# Patient Record
Sex: FEMALE | Race: WHITE | Employment: FULL TIME | URBAN - METROPOLITAN AREA
[De-identification: names, ages, dates, MRNs, and addresses within clinical notes are randomized per-mention and may not be internally consistent; named-entity substitution may affect disease eponyms.]

---

## 2017-03-16 ENCOUNTER — APPOINTMENT (OUTPATIENT)
Dept: LAB | Age: 57
End: 2017-03-16
Attending: NURSE PRACTITIONER
Payer: COMMERCIAL

## 2017-03-16 DIAGNOSIS — R82.90 FOUL SMELLING URINE: ICD-10-CM

## 2017-03-16 PROCEDURE — 87086 URINE CULTURE/COLONY COUNT: CPT

## 2017-07-20 ENCOUNTER — LAB ENCOUNTER (OUTPATIENT)
Dept: LAB | Age: 57
End: 2017-07-20
Attending: INTERNAL MEDICINE
Payer: COMMERCIAL

## 2017-07-20 DIAGNOSIS — R82.90 BAD ODOR OF URINE: ICD-10-CM

## 2017-07-20 PROCEDURE — 87186 SC STD MICRODIL/AGAR DIL: CPT

## 2017-07-20 PROCEDURE — 87088 URINE BACTERIA CULTURE: CPT

## 2017-07-20 PROCEDURE — 87086 URINE CULTURE/COLONY COUNT: CPT

## 2017-07-28 RX ORDER — CIPROFLOXACIN 250 MG/1
250 TABLET, FILM COATED ORAL 2 TIMES DAILY
Qty: 14 TABLET | Refills: 0 | OUTPATIENT
Start: 2017-07-28 | End: 2017-08-04

## 2017-07-28 NOTE — PROGRESS NOTES
Spoke with pt, expressed understanding, informed pt that Dr Kinsey Su wanted her to get labs and see urology. If unable to do so d/t living in [place] should seek medical care in [place].

## 2017-08-22 ENCOUNTER — WALK IN (OUTPATIENT)
Dept: URGENT CARE | Age: 57
End: 2017-08-22

## 2017-08-22 VITALS
BODY MASS INDEX: 22.02 KG/M2 | HEART RATE: 77 BPM | DIASTOLIC BLOOD PRESSURE: 62 MMHG | WEIGHT: 129 LBS | TEMPERATURE: 97.2 F | OXYGEN SATURATION: 97 % | HEIGHT: 64 IN | SYSTOLIC BLOOD PRESSURE: 98 MMHG

## 2017-08-22 DIAGNOSIS — J01.90 ACUTE RHINOSINUSITIS: Primary | ICD-10-CM

## 2017-08-22 PROCEDURE — 99203 OFFICE O/P NEW LOW 30 MIN: CPT | Performed by: FAMILY MEDICINE

## 2017-08-22 RX ORDER — AMOXICILLIN AND CLAVULANATE POTASSIUM 875; 125 MG/1; MG/1
1 TABLET, FILM COATED ORAL 2 TIMES DAILY
Qty: 20 TABLET | Refills: 0 | Status: SHIPPED | OUTPATIENT
Start: 2017-08-22 | End: 2017-09-01

## 2017-08-22 RX ORDER — BENZONATATE 100 MG/1
100 CAPSULE ORAL 3 TIMES DAILY PRN
Qty: 30 CAPSULE | Refills: 0 | Status: SHIPPED | OUTPATIENT
Start: 2017-08-22 | End: 2018-05-31 | Stop reason: ALTCHOICE

## 2017-08-22 ASSESSMENT — ENCOUNTER SYMPTOMS
TROUBLE SWALLOWING: 0
CHOKING: 0
SHORTNESS OF BREATH: 0
CHILLS: 0
COUGH: 1
DIARRHEA: 0
FATIGUE: 1
ACTIVITY CHANGE: 1
LIGHT-HEADEDNESS: 0
NUMBNESS: 0
SWOLLEN GLANDS: 1
DIAPHORESIS: 0
APPETITE CHANGE: 0
HEADACHES: 1
SINUS PRESSURE: 1
FEVER: 0
WEAKNESS: 0
STRIDOR: 0
EYES NEGATIVE: 1
CHEST TIGHTNESS: 0
DIZZINESS: 0
SORE THROAT: 1
ANOREXIA: 0
VOMITING: 0
WHEEZING: 0
ABDOMINAL PAIN: 0
RHINORRHEA: 1

## 2017-08-25 ENCOUNTER — TELEPHONE (OUTPATIENT)
Dept: TELEHEALTH | Age: 57
End: 2017-08-25

## 2018-03-05 ENCOUNTER — WALK IN (OUTPATIENT)
Dept: URGENT CARE | Age: 58
End: 2018-03-05

## 2018-03-05 VITALS
OXYGEN SATURATION: 97 % | DIASTOLIC BLOOD PRESSURE: 58 MMHG | HEART RATE: 85 BPM | SYSTOLIC BLOOD PRESSURE: 98 MMHG | RESPIRATION RATE: 16 BRPM | WEIGHT: 133 LBS | BODY MASS INDEX: 22.71 KG/M2 | TEMPERATURE: 97.7 F | HEIGHT: 64 IN

## 2018-03-05 DIAGNOSIS — R05.9 COUGH: ICD-10-CM

## 2018-03-05 DIAGNOSIS — J01.80 ACUTE NON-RECURRENT SINUSITIS OF OTHER SINUS: Primary | ICD-10-CM

## 2018-03-05 PROCEDURE — 99213 OFFICE O/P EST LOW 20 MIN: CPT | Performed by: NURSE PRACTITIONER

## 2018-03-05 RX ORDER — ALBUTEROL SULFATE 90 UG/1
2 AEROSOL, METERED RESPIRATORY (INHALATION) EVERY 4 HOURS PRN
Qty: 1 INHALER | Refills: 0 | Status: SHIPPED | OUTPATIENT
Start: 2018-03-05 | End: 2018-05-31 | Stop reason: ALTCHOICE

## 2018-03-05 RX ORDER — AMOXICILLIN AND CLAVULANATE POTASSIUM 875; 125 MG/1; MG/1
1 TABLET, FILM COATED ORAL EVERY 12 HOURS
Qty: 20 TABLET | Refills: 0 | Status: SHIPPED | OUTPATIENT
Start: 2018-03-05 | End: 2018-05-31 | Stop reason: ALTCHOICE

## 2018-03-06 ENCOUNTER — TELEPHONE (OUTPATIENT)
Dept: SCHEDULING | Age: 58
End: 2018-03-06

## 2018-05-31 ENCOUNTER — WALK IN (OUTPATIENT)
Dept: URGENT CARE | Age: 58
End: 2018-05-31

## 2018-05-31 VITALS
WEIGHT: 132.28 LBS | TEMPERATURE: 98.2 F | SYSTOLIC BLOOD PRESSURE: 120 MMHG | DIASTOLIC BLOOD PRESSURE: 61 MMHG | RESPIRATION RATE: 18 BRPM | OXYGEN SATURATION: 100 % | BODY MASS INDEX: 23.06 KG/M2 | HEART RATE: 77 BPM

## 2018-05-31 DIAGNOSIS — F17.200 SMOKER: ICD-10-CM

## 2018-05-31 DIAGNOSIS — J02.9 SORE THROAT: ICD-10-CM

## 2018-05-31 DIAGNOSIS — R06.2 WHEEZING: Primary | ICD-10-CM

## 2018-05-31 LAB
% PREDICTED PEAK FLOW: NORMAL % (ref 80–150)
ALBUTEROL DOSE: NORMAL INHALATIONS
AZMACORT DOSE: NORMAL INHALATIONS
FLOVENT DOSE: NORMAL INHALATIONS
O2 SATURATION,RESTING: NORMAL % (ref 95–100)
PEAK EXPIRATORY FLOW: 300 L/MIN (ref 30–900)
PULMICORT DOSE: NORMAL INHALATIONS
SINGULAIR DOSE: NORMAL INHALATIONS

## 2018-05-31 PROCEDURE — 99214 OFFICE O/P EST MOD 30 MIN: CPT | Performed by: FAMILY MEDICINE

## 2018-05-31 PROCEDURE — 94799 UNLISTED PULMONARY SVC/PX: CPT | Performed by: FAMILY MEDICINE

## 2018-05-31 RX ORDER — ALBUTEROL SULFATE 90 UG/1
2 AEROSOL, METERED RESPIRATORY (INHALATION) EVERY 4 HOURS PRN
Qty: 1 INHALER | Refills: 3 | Status: SHIPPED | OUTPATIENT
Start: 2018-05-31 | End: 2019-09-23 | Stop reason: ALTCHOICE

## 2018-05-31 RX ORDER — PREDNISONE 20 MG/1
TABLET ORAL
Qty: 10 TABLET | Refills: 0 | Status: SHIPPED | OUTPATIENT
Start: 2018-05-31 | End: 2018-06-05

## 2018-05-31 ASSESSMENT — ENCOUNTER SYMPTOMS
SHORTNESS OF BREATH: 1
COUGH: 1
RHINORRHEA: 0
FEVER: 0
SORE THROAT: 1
WHEEZING: 1

## 2018-05-31 ASSESSMENT — COPD QUESTIONNAIRES: COPD: 0

## 2018-11-08 ENCOUNTER — WALK IN (OUTPATIENT)
Dept: URGENT CARE | Age: 58
End: 2018-11-08

## 2018-11-08 VITALS
HEART RATE: 80 BPM | HEIGHT: 64 IN | SYSTOLIC BLOOD PRESSURE: 94 MMHG | TEMPERATURE: 98.2 F | OXYGEN SATURATION: 100 % | RESPIRATION RATE: 18 BRPM | BODY MASS INDEX: 23.39 KG/M2 | WEIGHT: 137 LBS | DIASTOLIC BLOOD PRESSURE: 58 MMHG

## 2018-11-08 DIAGNOSIS — Z86.14 HISTORY OF MRSA INFECTION: ICD-10-CM

## 2018-11-08 DIAGNOSIS — J34.89 IRRITATION OF NOSE: Primary | ICD-10-CM

## 2018-11-08 PROCEDURE — 87147 CULTURE TYPE IMMUNOLOGIC: CPT | Performed by: INTERNAL MEDICINE

## 2018-11-08 PROCEDURE — 87186 SC STD MICRODIL/AGAR DIL: CPT | Performed by: INTERNAL MEDICINE

## 2018-11-08 PROCEDURE — 87070 CULTURE OTHR SPECIMN AEROBIC: CPT | Performed by: INTERNAL MEDICINE

## 2018-11-08 PROCEDURE — 99213 OFFICE O/P EST LOW 20 MIN: CPT | Performed by: PHYSICIAN ASSISTANT

## 2018-11-08 PROCEDURE — 87205 SMEAR GRAM STAIN: CPT | Performed by: INTERNAL MEDICINE

## 2018-11-08 ASSESSMENT — ENCOUNTER SYMPTOMS
CHILLS: 0
SORE THROAT: 0
FACIAL SWELLING: 0
DIZZINESS: 0
FEVER: 0
HEADACHES: 0
COUGH: 0
ABDOMINAL PAIN: 0

## 2018-11-11 ENCOUNTER — CLINICAL ABSTRACT (OUTPATIENT)
Dept: OTHER | Age: 58
End: 2018-11-11

## 2018-11-11 LAB
BACTERIA SPEC AEROBE CULT: ABNORMAL
GRAM STN SPEC: ABNORMAL
ORGANISM: ABNORMAL
REPORT STATUS (RPT): ABNORMAL
SPECIMEN SOURCE: ABNORMAL

## 2018-11-12 ENCOUNTER — TELEPHONE (OUTPATIENT)
Dept: URGENT CARE | Age: 58
End: 2018-11-12

## 2019-06-27 ENCOUNTER — WALK IN (OUTPATIENT)
Dept: URGENT CARE | Age: 59
End: 2019-06-27

## 2019-06-27 VITALS
HEART RATE: 76 BPM | DIASTOLIC BLOOD PRESSURE: 60 MMHG | OXYGEN SATURATION: 97 % | RESPIRATION RATE: 16 BRPM | WEIGHT: 129 LBS | TEMPERATURE: 97.1 F | SYSTOLIC BLOOD PRESSURE: 123 MMHG | BODY MASS INDEX: 22.49 KG/M2

## 2019-06-27 DIAGNOSIS — J06.9 ACUTE URI: Primary | ICD-10-CM

## 2019-06-27 DIAGNOSIS — J02.9 SORE THROAT: ICD-10-CM

## 2019-06-27 LAB — S PYO AG THROAT QL IA.RAPID: NEGATIVE

## 2019-06-27 PROCEDURE — 99213 OFFICE O/P EST LOW 20 MIN: CPT | Performed by: FAMILY MEDICINE

## 2019-06-27 PROCEDURE — 87880 STREP A ASSAY W/OPTIC: CPT | Performed by: FAMILY MEDICINE

## 2019-06-27 RX ORDER — AZITHROMYCIN 250 MG/1
TABLET, FILM COATED ORAL
Qty: 6 TABLET | Refills: 0 | Status: SHIPPED | OUTPATIENT
Start: 2019-06-27 | End: 2019-09-23 | Stop reason: ALTCHOICE

## 2019-06-27 RX ORDER — BENZONATATE 100 MG/1
100 CAPSULE ORAL 3 TIMES DAILY PRN
Qty: 20 CAPSULE | Refills: 0 | Status: SHIPPED | OUTPATIENT
Start: 2019-06-27

## 2019-09-23 ENCOUNTER — WALK IN (OUTPATIENT)
Dept: URGENT CARE | Age: 59
End: 2019-09-23

## 2019-09-23 VITALS
DIASTOLIC BLOOD PRESSURE: 68 MMHG | BODY MASS INDEX: 22.84 KG/M2 | WEIGHT: 131 LBS | OXYGEN SATURATION: 98 % | HEART RATE: 90 BPM | RESPIRATION RATE: 16 BRPM | TEMPERATURE: 97.7 F | SYSTOLIC BLOOD PRESSURE: 102 MMHG

## 2019-09-23 DIAGNOSIS — F17.200 SMOKER: ICD-10-CM

## 2019-09-23 DIAGNOSIS — J40 BRONCHITIS: Primary | ICD-10-CM

## 2019-09-23 DIAGNOSIS — J45.20 MILD INTERMITTENT ASTHMA WITHOUT COMPLICATION: ICD-10-CM

## 2019-09-23 DIAGNOSIS — R06.2 WHEEZING: ICD-10-CM

## 2019-09-23 PROCEDURE — 94640 AIRWAY INHALATION TREATMENT: CPT | Performed by: NURSE PRACTITIONER

## 2019-09-23 PROCEDURE — 99214 OFFICE O/P EST MOD 30 MIN: CPT | Performed by: NURSE PRACTITIONER

## 2019-09-23 RX ORDER — IPRATROPIUM BROMIDE AND ALBUTEROL SULFATE 2.5; .5 MG/3ML; MG/3ML
3 SOLUTION RESPIRATORY (INHALATION) ONCE
Status: COMPLETED | OUTPATIENT
Start: 2019-09-23 | End: 2019-09-23

## 2019-09-23 RX ORDER — AZITHROMYCIN 250 MG/1
TABLET, FILM COATED ORAL
Qty: 6 TABLET | Refills: 0 | Status: SHIPPED | OUTPATIENT
Start: 2019-09-23 | End: 2019-09-28

## 2019-09-23 RX ORDER — ALBUTEROL SULFATE 90 UG/1
2 AEROSOL, METERED RESPIRATORY (INHALATION) EVERY 4 HOURS PRN
Qty: 1 INHALER | Refills: 0 | Status: SHIPPED | OUTPATIENT
Start: 2019-09-23

## 2019-09-23 RX ADMIN — IPRATROPIUM BROMIDE AND ALBUTEROL SULFATE 3 ML: 2.5; .5 SOLUTION RESPIRATORY (INHALATION) at 14:07

## 2019-09-23 ASSESSMENT — ENCOUNTER SYMPTOMS
COUGH: 1
ACTIVITY CHANGE: 0
SORE THROAT: 0
TROUBLE SWALLOWING: 0
APPETITE CHANGE: 0
WHEEZING: 1
CHILLS: 1
SHORTNESS OF BREATH: 1
FEVER: 0
SINUS PRESSURE: 1

## 2019-09-26 ENCOUNTER — TELEPHONE (OUTPATIENT)
Dept: URGENT CARE | Age: 59
End: 2019-09-26

## 2019-09-26 ENCOUNTER — WALK IN (OUTPATIENT)
Dept: URGENT CARE | Age: 59
End: 2019-09-26

## 2019-09-26 VITALS
HEART RATE: 76 BPM | RESPIRATION RATE: 16 BRPM | SYSTOLIC BLOOD PRESSURE: 126 MMHG | WEIGHT: 130 LBS | BODY MASS INDEX: 22.67 KG/M2 | DIASTOLIC BLOOD PRESSURE: 76 MMHG | TEMPERATURE: 98.6 F

## 2019-09-26 DIAGNOSIS — N30.01 ACUTE CYSTITIS WITH HEMATURIA: Primary | ICD-10-CM

## 2019-09-26 DIAGNOSIS — R35.0 URINARY FREQUENCY: ICD-10-CM

## 2019-09-26 DIAGNOSIS — R30.0 DYSURIA: ICD-10-CM

## 2019-09-26 LAB
APPEARANCE UR: CLEAR
BACTERIA #/AREA URNS HPF: ABNORMAL /HPF
BILIRUB UR QL STRIP: NEGATIVE
BILIRUB UR QL STRIP: NORMAL
CLARITY, POC: NORMAL
COLOR UR: NORMAL
COLOR UR: YELLOW
GLUCOSE UR STRIP-MCNC: NEGATIVE MG/DL
GLUCOSE UR-MCNC: NEGATIVE MG/DL
HGB UR QL STRIP: ABNORMAL
HYALINE CASTS #/AREA URNS LPF: ABNORMAL /LPF (ref 0–5)
KETONES UR STRIP-MCNC: ABNORMAL MG/DL
KETONES, POC: NORMAL
LEUKOCYTE ESTERASE UR QL STRIP: ABNORMAL
LEUKOCYTE ESTERASE UR QL STRIP: NORMAL
NITRITE UR QL STRIP: NEGATIVE
NITRITE UR QL STRIP: NEGATIVE
OCCULT BLOOD, POC: NORMAL
PH UR STRIP: 5.5 UNITS (ref 5–7)
PH UR STRIP: 5.5 [PH]
PROT UR STRIP-MCNC: ABNORMAL MG/DL
PROT UR-MCNC: NORMAL G/DL
RBC #/AREA URNS HPF: ABNORMAL /HPF (ref 0–2)
SP GR UR STRIP: 1.02
SP GR UR STRIP: 1.02 (ref 1–1.03)
SPECIMEN SOURCE: ABNORMAL
SQUAMOUS #/AREA URNS HPF: ABNORMAL /HPF (ref 0–5)
UROBILINOGEN UR STRIP-MCNC: 0.2 MG/DL (ref 0–1)
UROBILINOGEN UR-MCNC: 0.2 MG/DL (ref 0–1)
WBC #/AREA URNS HPF: ABNORMAL /HPF (ref 0–5)

## 2019-09-26 PROCEDURE — 99213 OFFICE O/P EST LOW 20 MIN: CPT | Performed by: PHYSICIAN ASSISTANT

## 2019-09-26 PROCEDURE — 81001 URINALYSIS AUTO W/SCOPE: CPT | Performed by: PHYSICIAN ASSISTANT

## 2019-09-26 PROCEDURE — 87086 URINE CULTURE/COLONY COUNT: CPT | Performed by: INTERNAL MEDICINE

## 2019-09-26 PROCEDURE — 87088 URINE BACTERIA CULTURE: CPT | Performed by: INTERNAL MEDICINE

## 2019-09-26 PROCEDURE — 87186 SC STD MICRODIL/AGAR DIL: CPT | Performed by: INTERNAL MEDICINE

## 2019-09-26 RX ORDER — NITROFURANTOIN 25; 75 MG/1; MG/1
100 CAPSULE ORAL 2 TIMES DAILY
Qty: 10 CAPSULE | Refills: 0 | Status: SHIPPED | OUTPATIENT
Start: 2019-09-26 | End: 2019-10-01

## 2019-09-26 RX ORDER — NITROFURANTOIN 25; 75 MG/1; MG/1
100 CAPSULE ORAL 2 TIMES DAILY
Qty: 10 CAPSULE | Refills: 0 | Status: SHIPPED | OUTPATIENT
Start: 2019-09-26 | End: 2019-09-26

## 2019-09-26 ASSESSMENT — ENCOUNTER SYMPTOMS
CHILLS: 0
FEVER: 0
BACK PAIN: 0
SHORTNESS OF BREATH: 0
VOMITING: 0
ABDOMINAL PAIN: 0
DIARRHEA: 0

## 2019-09-28 LAB
BACTERIA UR CULT: ABNORMAL
ORGANISM: ABNORMAL
REPORT STATUS (RPT): ABNORMAL
SPECIMEN SOURCE: ABNORMAL

## 2019-10-30 ENCOUNTER — WALK IN (OUTPATIENT)
Dept: URGENT CARE | Age: 59
End: 2019-10-30

## 2019-10-30 VITALS
TEMPERATURE: 97 F | WEIGHT: 133 LBS | BODY MASS INDEX: 23.19 KG/M2 | HEART RATE: 76 BPM | SYSTOLIC BLOOD PRESSURE: 109 MMHG | RESPIRATION RATE: 14 BRPM | DIASTOLIC BLOOD PRESSURE: 62 MMHG | OXYGEN SATURATION: 100 %

## 2019-10-30 DIAGNOSIS — J01.90 ACUTE NON-RECURRENT SINUSITIS, UNSPECIFIED LOCATION: Primary | ICD-10-CM

## 2019-10-30 PROCEDURE — 99213 OFFICE O/P EST LOW 20 MIN: CPT | Performed by: NURSE PRACTITIONER

## 2019-10-30 RX ORDER — AMOXICILLIN AND CLAVULANATE POTASSIUM 875; 125 MG/1; MG/1
1 TABLET, FILM COATED ORAL 2 TIMES DAILY
Qty: 20 TABLET | Refills: 0 | Status: SHIPPED | OUTPATIENT
Start: 2019-10-30 | End: 2019-11-09

## 2019-10-30 ASSESSMENT — ENCOUNTER SYMPTOMS
COLOR CHANGE: 0
SINUS PAIN: 1
EYE REDNESS: 0
EYE DISCHARGE: 0
WEAKNESS: 0
LIGHT-HEADEDNESS: 0
DIARRHEA: 0
SORE THROAT: 1
FEVER: 0
WHEEZING: 0
COUGH: 1
SHORTNESS OF BREATH: 0
DIZZINESS: 0
NAUSEA: 0
VOMITING: 0
CHILLS: 0
RHINORRHEA: 0
SINUS PRESSURE: 1
HEADACHES: 1
ABDOMINAL PAIN: 0
BACK PAIN: 0

## 2019-12-06 ENCOUNTER — TELEPHONE (OUTPATIENT)
Dept: OTHER | Age: 59
End: 2019-12-06

## 2021-10-11 ENCOUNTER — OFFICE VISIT (OUTPATIENT)
Dept: URGENT CARE | Age: 61
End: 2021-10-11

## 2021-10-11 ENCOUNTER — TELEPHONE (OUTPATIENT)
Dept: SCHEDULING | Age: 61
End: 2021-10-11

## 2021-10-11 VITALS
TEMPERATURE: 97.7 F | OXYGEN SATURATION: 98 % | HEIGHT: 63 IN | DIASTOLIC BLOOD PRESSURE: 80 MMHG | BODY MASS INDEX: 23.04 KG/M2 | SYSTOLIC BLOOD PRESSURE: 110 MMHG | HEART RATE: 69 BPM | WEIGHT: 130 LBS

## 2021-10-11 DIAGNOSIS — B96.89 ACUTE BACTERIAL RHINOSINUSITIS: Primary | ICD-10-CM

## 2021-10-11 DIAGNOSIS — J01.90 ACUTE BACTERIAL RHINOSINUSITIS: Primary | ICD-10-CM

## 2021-10-11 PROCEDURE — 99213 OFFICE O/P EST LOW 20 MIN: CPT | Performed by: NURSE PRACTITIONER

## 2021-10-11 RX ORDER — DOXYCYCLINE HYCLATE 100 MG/1
100 CAPSULE ORAL 2 TIMES DAILY
Qty: 14 CAPSULE | Refills: 0 | Status: SHIPPED | OUTPATIENT
Start: 2021-10-11 | End: 2021-10-18

## 2021-10-11 ASSESSMENT — ENCOUNTER SYMPTOMS
EYE REDNESS: 1
EYE ITCHING: 0
EYE DISCHARGE: 0
HEADACHES: 1
COUGH: 1
CHILLS: 0
FATIGUE: 0
SORE THROAT: 0
FEVER: 0
DIARRHEA: 0
VOMITING: 0
SINUS PRESSURE: 1
NAUSEA: 0

## 2021-10-11 ASSESSMENT — PAIN SCALES - GENERAL: PAINLEVEL: 0

## 2023-02-20 ENCOUNTER — APPOINTMENT (OUTPATIENT)
Dept: FAMILY MEDICINE | Age: 63
End: 2023-02-20

## 2023-03-24 ENCOUNTER — APPOINTMENT (OUTPATIENT)
Dept: FAMILY MEDICINE | Age: 63
End: 2023-03-24

## 2023-04-25 DIAGNOSIS — Z12.31 ENCOUNTER FOR SCREENING MAMMOGRAM FOR MALIGNANT NEOPLASM OF BREAST: Primary | ICD-10-CM
